# Patient Record
Sex: MALE | URBAN - METROPOLITAN AREA
[De-identification: names, ages, dates, MRNs, and addresses within clinical notes are randomized per-mention and may not be internally consistent; named-entity substitution may affect disease eponyms.]

---

## 2022-07-25 ENCOUNTER — OFFICE VISIT (OUTPATIENT)
Dept: URBAN - METROPOLITAN AREA CLINIC 78 | Facility: CLINIC | Age: 22
End: 2022-07-25

## 2023-05-19 ENCOUNTER — DASHBOARD ENCOUNTERS (OUTPATIENT)
Age: 23
End: 2023-05-19

## 2023-05-19 ENCOUNTER — CLAIMS CREATED FROM THE CLAIM WINDOW (OUTPATIENT)
Dept: URBAN - METROPOLITAN AREA CLINIC 111 | Facility: CLINIC | Age: 23
End: 2023-05-19
Payer: SELF-PAY

## 2023-05-19 VITALS
SYSTOLIC BLOOD PRESSURE: 147 MMHG | HEIGHT: 66 IN | HEART RATE: 94 BPM | DIASTOLIC BLOOD PRESSURE: 89 MMHG | WEIGHT: 278 LBS | TEMPERATURE: 98 F | BODY MASS INDEX: 44.68 KG/M2

## 2023-05-19 DIAGNOSIS — K92.1 BLOOD IN STOOL: ICD-10-CM

## 2023-05-19 DIAGNOSIS — R10.13 DYSPEPSIA: ICD-10-CM

## 2023-05-19 PROCEDURE — 99203 OFFICE O/P NEW LOW 30 MIN: CPT | Performed by: NURSE PRACTITIONER

## 2023-05-19 PROCEDURE — 99203 OFFICE O/P NEW LOW 30 MIN: CPT | Performed by: INTERNAL MEDICINE

## 2023-05-19 RX ORDER — OMEPRAZOLE 40 MG/1
1 CAPSULE 30 MINUTES BEFORE MORNING MEAL CAPSULE, DELAYED RELEASE ORAL
Qty: 45 | Refills: 1 | OUTPATIENT
Start: 2023-05-19

## 2023-05-19 NOTE — HPI-TODAY'S VISIT:
23 yo male presents for dyspepsia. Reports stomach burning, nausea and epigastric discomfort after eating. He went to the  for this, taking omeprazole with some relief. Has intermittent blood in stool. Denies constipation. Has normal bowel movements. Denies fever, chills, vomiting, early satiety, dysphagia, odynophagia, melena or weight loss. Has fatigue. Denies fh of colon polyp or colon cancer.